# Patient Record
Sex: MALE | URBAN - METROPOLITAN AREA
[De-identification: names, ages, dates, MRNs, and addresses within clinical notes are randomized per-mention and may not be internally consistent; named-entity substitution may affect disease eponyms.]

---

## 2023-01-18 ENCOUNTER — OFFICE VISIT (OUTPATIENT)
Dept: URBAN - METROPOLITAN AREA CLINIC 7 | Facility: CLINIC | Age: 48
End: 2023-01-18
Payer: OTHER GOVERNMENT

## 2023-01-18 DIAGNOSIS — H43.11 VITREOUS HEMORRHAGE, RIGHT EYE: ICD-10-CM

## 2023-01-18 DIAGNOSIS — H25.13 AGE-RELATED NUCLEAR CATARACT, BILATERAL: ICD-10-CM

## 2023-01-18 DIAGNOSIS — H43.811 VITREOUS DEGENERATION, RIGHT EYE: ICD-10-CM

## 2023-01-18 DIAGNOSIS — H33.021 RETINAL DETACHMENT WITH MULTIPLE BREAKS, RIGHT EYE: Primary | ICD-10-CM

## 2023-01-18 PROCEDURE — 99204 OFFICE O/P NEW MOD 45 MIN: CPT | Performed by: OPHTHALMOLOGY

## 2023-01-18 PROCEDURE — 92134 CPTRZ OPH DX IMG PST SGM RTA: CPT | Performed by: OPHTHALMOLOGY

## 2023-01-18 RX ORDER — OFLOXACIN 3 MG/ML
0.3 % SOLUTION/ DROPS OPHTHALMIC
Qty: 5 | Refills: 1 | Status: ACTIVE
Start: 2023-01-18

## 2023-01-18 RX ORDER — PREDNISOLONE ACETATE 10 MG/ML
1 % SUSPENSION/ DROPS OPHTHALMIC
Qty: 5 | Refills: 2 | Status: ACTIVE
Start: 2023-01-18

## 2023-01-18 ASSESSMENT — INTRAOCULAR PRESSURE
OD: 8
OS: 12

## 2023-01-18 NOTE — IMPRESSION/PLAN
Impression: Retinal detachment with multiple breaks, right eye: H33.021. Right.
-symptoms x 2 months 
-chronic shallow mac off RD

OCT: 
OD: shallow SRF, possible PVD OS: wnl Plan: Examination reveals a retinal detachment. The diagnosis, natural history, and prognosis of rhegmatogenous retinal detachment, as well as the risks and benefits of intervention were discussed at length. . To reduce the risk of further visual loss, treatment is strongly recommended. The patient was informed of various surgical techniques; altitude precautions with a gas bubble, including the danger of loss of the eye with travel to a higher altitude or flying; and the possible need to update spectacle correction if a scleral buckle is used. The patient understands that the vision usually improves gradually over a period of several months to 1 year, but may not improve to prior levels. The patient elects to proceed with retinal detachment repair. Risk of redetachment, or proliferative vitreoretinopathy, scar formation, macular pucker, hyphema, glaucoma, hypotony, infection, loss of vision, loss of eye, blindness were fully explained to the patient who voices understanding and agreed to proceed with surgery.  

PLAN: 25g PPV, EL, SB, C3F8 OD x RD (1hr, next week, sleep on left side)

## 2023-01-25 ENCOUNTER — POST-OPERATIVE VISIT (OUTPATIENT)
Dept: URBAN - METROPOLITAN AREA CLINIC 7 | Facility: CLINIC | Age: 48
End: 2023-01-25
Payer: OTHER GOVERNMENT

## 2023-01-25 DIAGNOSIS — H33.021 RETINAL DETACHMENT WITH MULTIPLE BREAKS, RIGHT EYE: Primary | ICD-10-CM

## 2023-01-25 PROCEDURE — 99024 POSTOP FOLLOW-UP VISIT: CPT | Performed by: OPHTHALMOLOGY

## 2023-01-25 RX ORDER — BRIMONIDINE TARTRATE 1 MG/ML
0.1 % SOLUTION/ DROPS OPHTHALMIC
Qty: 5 | Refills: 0 | Status: ACTIVE
Start: 2023-01-25

## 2023-01-25 ASSESSMENT — INTRAOCULAR PRESSURE
OD: 27
OS: 12

## 2023-01-25 NOTE — IMPRESSION/PLAN
Impression: S/P 25g PPV, EL, SB, C3F8 OD x RD OD - 1 Day. Retinal detachment with multiple breaks, right eye  H33.021. Plan: PF/Oflox QID OD. Start Brimonidine BID OD. Gas precautions Sleep on left side.  

RTC 1 week POS DFE OD

## 2023-03-07 ENCOUNTER — POST-OPERATIVE VISIT (OUTPATIENT)
Dept: URBAN - METROPOLITAN AREA CLINIC 13 | Facility: CLINIC | Age: 48
End: 2023-03-07
Payer: OTHER GOVERNMENT

## 2023-03-07 DIAGNOSIS — H33.021 RETINAL DETACHMENT WITH MULTIPLE BREAKS, RIGHT EYE: Primary | ICD-10-CM

## 2023-03-07 PROCEDURE — 99024 POSTOP FOLLOW-UP VISIT: CPT | Performed by: OPHTHALMOLOGY

## 2023-03-07 ASSESSMENT — INTRAOCULAR PRESSURE
OS: 18
OD: 15

## 2023-03-07 NOTE — IMPRESSION/PLAN
Impression: S/P 25g PPV, EL, SB, C3F8 OD x RD OD - 42 Days. Retinal detachment with multiple breaks, right eye  H33.021. OCT:
OD: wnl OS: wnl Plan: Off drops. Retina attached. Sleep on side. Gas precautions.  

RTC 3 months DFE OU OCT OU